# Patient Record
(demographics unavailable — no encounter records)

---

## 2024-12-18 NOTE — DISCUSSION/SUMMARY
[de-identified] : 84-year-old woman s/p ORIF left acetabulum, nearly 10 months out. and a left distal radius fracture treated nonoperatively, approximately 6.5 months out.  -X-ray and physical exam findings were discussed with the patient -WBAT left LE, WBAT left UE -Physical Therapy: ROM and gait training LLE, ROM of LUE -Follow-up as needed with x-rays of the pelvis and left wrist at that time.  -All of the patient's questions and concerns were addressed.

## 2024-12-18 NOTE — PHYSICAL EXAM
[de-identified] : The patient is sitting comfortably in the exam room. LEFT hip -Skin is intact, no swelling, no ecchymosis -incisions well-healed, no erythema, no signs of infection -No tenderness to palpation over the greater trochanter -No pain with passive range of motion of hip -Sensation is intact L1-S1 -5/5 EHL, FHL, TA, GS, quadriceps, hamstrings -Foot is warm and well-perfused, palpable dorsalis pedis pulse.  The patient is sitting comfortably in the exam room.  LEFT wrist. -Skin is intact, no swelling, no ecchymosis -Pronation 90, supination 90 -Able to make a fist -Sensation is intact median, radial, ulnar, axillary nerves -Motor is intact median, radial, ulnar, axillary nerves -Hand is warm and well-perfused, Palpable radial and ulnar pulses  [de-identified] : X-rays of the pelvis were taken today, 12/18/24. AP, Inlet, Outlet, Judet views. Patient is status post ORIF of the left acetabulum.  No significant change from previous images. There is migration of the femoral head medially and superiorly. The implants are in good position. 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   X-rays of the left wrist were taken in the office today, 12/18/24. AP, Oblique and Lateral.  X-rays show good overall alignment of the wrist.  The patient is ulnar positive.  There is a distal radius fracture with intra-articular extension.

## 2024-12-18 NOTE — HISTORY OF PRESENT ILLNESS
[de-identified] : Ms. GARY IRVIN is a 84 year old woman presents today for follow up appointment s/p ORIF left acetabulum on 2/23/24 and a left distal radius fracture on 5/31/24 treated nonoperatively. Since her last visit she states she is feeling better. She is ambulating with a walker and notes mild intermittent left hip pain. She denies pain to the left wrist.

## 2024-12-18 NOTE — HISTORY OF PRESENT ILLNESS
[de-identified] : Ms. GARY IRVIN is a 84 year old woman presents today for follow up appointment s/p ORIF left acetabulum on 2/23/24 and a left distal radius fracture on 5/31/24 treated nonoperatively. Since her last visit she states she is feeling better. She is ambulating with a walker and notes mild intermittent left hip pain. She denies pain to the left wrist.

## 2024-12-18 NOTE — REASON FOR VISIT
[Follow-Up Visit] : a follow-up visit for [FreeTextEntry2] : s/p ORIF left acetabulum, DOS: 2/23/24.

## 2024-12-18 NOTE — DISCUSSION/SUMMARY
[de-identified] : 84-year-old woman s/p ORIF left acetabulum, nearly 10 months out. and a left distal radius fracture treated nonoperatively, approximately 6.5 months out.  -X-ray and physical exam findings were discussed with the patient -WBAT left LE, WBAT left UE -Physical Therapy: ROM and gait training LLE, ROM of LUE -Follow-up as needed with x-rays of the pelvis and left wrist at that time.  -All of the patient's questions and concerns were addressed.